# Patient Record
Sex: MALE
[De-identification: names, ages, dates, MRNs, and addresses within clinical notes are randomized per-mention and may not be internally consistent; named-entity substitution may affect disease eponyms.]

---

## 2022-07-30 ENCOUNTER — TELEPHONE ENCOUNTER (OUTPATIENT)
Age: 57
End: 2022-07-30

## 2022-07-30 RX ORDER — FAMOTIDINE 10 MG/1
2 (TWO) TABLET, FILM COATED ORAL
Qty: 0 | Refills: 16 | OUTPATIENT
Start: 2019-10-09 | End: 2019-11-12

## 2022-07-31 ENCOUNTER — TELEPHONE ENCOUNTER (OUTPATIENT)
Age: 57
End: 2022-07-31

## 2022-07-31 RX ORDER — FAMOTIDINE 10 MG/1
2 (TWO) TABLET, FILM COATED ORAL
Qty: 0 | Refills: 16 | Status: ACTIVE | COMMUNITY
Start: 2019-10-09

## 2022-07-31 RX ORDER — FAMOTIDINE 20 MG/1
1 (ONE) TABLET ORAL
Qty: 0 | Refills: 10 | Status: ACTIVE | COMMUNITY
Start: 2019-11-12

## 2024-07-16 ENCOUNTER — OFFICE VISIT (OUTPATIENT)
Dept: URBAN - METROPOLITAN AREA CLINIC 9 | Facility: CLINIC | Age: 59
End: 2024-07-16

## 2024-07-18 ENCOUNTER — OFFICE VISIT (OUTPATIENT)
Dept: URBAN - METROPOLITAN AREA CLINIC 9 | Facility: CLINIC | Age: 59
End: 2024-07-18

## 2024-07-22 ENCOUNTER — DASHBOARD ENCOUNTERS (OUTPATIENT)
Age: 59
End: 2024-07-22

## 2024-07-22 ENCOUNTER — OFFICE VISIT (OUTPATIENT)
Dept: URBAN - METROPOLITAN AREA CLINIC 9 | Facility: CLINIC | Age: 59
End: 2024-07-22
Payer: COMMERCIAL

## 2024-07-22 VITALS
HEIGHT: 68 IN | SYSTOLIC BLOOD PRESSURE: 142 MMHG | WEIGHT: 165 LBS | BODY MASS INDEX: 25.01 KG/M2 | DIASTOLIC BLOOD PRESSURE: 80 MMHG

## 2024-07-22 DIAGNOSIS — R07.89 ATYPICAL CHEST PAIN: ICD-10-CM

## 2024-07-22 PROCEDURE — 99204 OFFICE O/P NEW MOD 45 MIN: CPT | Performed by: INTERNAL MEDICINE

## 2024-07-22 RX ORDER — OMEPRAZOLE 40 MG/1
1 CAPSULE 30 MINUTES BEFORE MORNING MEAL CAPSULE, DELAYED RELEASE ORAL ONCE A DAY
Qty: 30 | Refills: 5 | OUTPATIENT

## 2024-07-22 RX ORDER — ICOSAPENT ETHYL 500 MG/1
TAKE 1 CAPSULE BY MOUTH TWICE A DAY CAPSULE ORAL
Qty: 60 EACH | Refills: 1 | Status: ACTIVE | COMMUNITY

## 2024-07-22 RX ORDER — FAMOTIDINE 10 MG/1
2 (TWO) TABLET, FILM COATED ORAL
Qty: 0 | Refills: 16 | Status: ON HOLD | COMMUNITY
Start: 2019-10-09

## 2024-07-22 RX ORDER — ASPIRIN 81 MG/1
TAKE 1 TABLET BY MOUTH EVERY DAY TABLET, COATED ORAL
Qty: 90 EACH | Refills: 0 | Status: ACTIVE | COMMUNITY

## 2024-07-22 RX ORDER — OMEPRAZOLE 40 MG/1
CAPSULE, DELAYED RELEASE ORAL
Qty: 30 CAPSULE | Status: ACTIVE | COMMUNITY

## 2024-07-22 RX ORDER — LISINOPRIL 20 MG/1
TAKE 1 TABLET BY MOUTH EVERY DAY AT NIGHT TABLET ORAL
Qty: 90 EACH | Refills: 2 | Status: ACTIVE | COMMUNITY

## 2024-07-22 RX ORDER — METOPROLOL SUCCINATE 25 MG/1
TAKE 1 TABLET BY MOUTH EVERY DAY TABLET, EXTENDED RELEASE ORAL
Qty: 90 EACH | Refills: 1 | Status: ACTIVE | COMMUNITY

## 2024-07-22 RX ORDER — FAMOTIDINE 20 MG/1
1 (ONE) TABLET ORAL
Qty: 0 | Refills: 10 | Status: ON HOLD | COMMUNITY
Start: 2019-11-12

## 2024-07-22 NOTE — HPI-TODAY'S VISIT:
Pt here today for hospital f/u . Hx/o CAD s/p cardiac event years ago . 2024 I Reviewed CBC, BMP, lfts 2024 CTA a/p neg for any acute process . 2016 Colon negative, repeat 10 years 2019 EGD with no wall's, HP neg gastritis . pt now here for f/u. he is on PPI. Pt was discharged and ultimately had cardiac evaluation that was negative. He was discharged and told it was gerd related and sent for GI evaluation.  . Pt has not had a recent EGD. He did have chest pain and we need an EGD for evaluation pending cards clearance. Willl arrange. Cont PPI that he just restarted. RTC pending the above. .
..  - Toprol, plavix and statin continued  - trop negative

## 2024-07-22 NOTE — PHYSICAL EXAM CONSTITUTIONAL:
Message relayed and patient voiced understanding.    well developed, well nourished , in no acute distress , normal communication ability

## 2024-08-14 ENCOUNTER — TELEPHONE ENCOUNTER (OUTPATIENT)
Dept: URBAN - METROPOLITAN AREA CLINIC 9 | Facility: CLINIC | Age: 59
End: 2024-08-14

## 2024-08-16 ENCOUNTER — OFFICE VISIT (OUTPATIENT)
Dept: URBAN - METROPOLITAN AREA SURGERY CENTER 9 | Facility: SURGERY CENTER | Age: 59
End: 2024-08-16
Payer: COMMERCIAL

## 2024-08-16 ENCOUNTER — CLAIMS CREATED FROM THE CLAIM WINDOW (OUTPATIENT)
Dept: URBAN - METROPOLITAN AREA CLINIC 4 | Facility: CLINIC | Age: 59
End: 2024-08-16
Payer: COMMERCIAL

## 2024-08-16 DIAGNOSIS — K29.70 GASTRITIS: ICD-10-CM

## 2024-08-16 DIAGNOSIS — K29.70 GASTRITIS WITHOUT BLEEDING, UNSPECIFIED CHRONICITY, UNSPECIFIED GASTRITIS TYPE: ICD-10-CM

## 2024-08-16 DIAGNOSIS — K29.70 GASTRITIS, UNSPECIFIED, WITHOUT BLEEDING: ICD-10-CM

## 2024-08-16 PROCEDURE — 88312 SPECIAL STAINS GROUP 1: CPT | Performed by: PATHOLOGY

## 2024-08-16 PROCEDURE — 00731 ANES UPR GI NDSC PX NOS: CPT | Performed by: NURSE ANESTHETIST, CERTIFIED REGISTERED

## 2024-08-16 PROCEDURE — 88305 TISSUE EXAM BY PATHOLOGIST: CPT | Performed by: PATHOLOGY

## 2024-08-16 PROCEDURE — 43239 EGD BIOPSY SINGLE/MULTIPLE: CPT | Performed by: INTERNAL MEDICINE

## 2024-08-16 RX ORDER — OMEPRAZOLE 40 MG/1
1 CAPSULE 30 MINUTES BEFORE MORNING MEAL CAPSULE, DELAYED RELEASE ORAL ONCE A DAY
Qty: 30 | Refills: 5 | Status: ACTIVE | COMMUNITY

## 2024-08-16 RX ORDER — ASPIRIN 81 MG/1
TAKE 1 TABLET BY MOUTH EVERY DAY TABLET, COATED ORAL
Qty: 90 EACH | Refills: 0 | Status: ACTIVE | COMMUNITY

## 2024-08-16 RX ORDER — LISINOPRIL 20 MG/1
TAKE 1 TABLET BY MOUTH EVERY DAY AT NIGHT TABLET ORAL
Qty: 90 EACH | Refills: 2 | Status: ACTIVE | COMMUNITY

## 2024-08-16 RX ORDER — METOPROLOL SUCCINATE 25 MG/1
TAKE 1 TABLET BY MOUTH EVERY DAY TABLET, EXTENDED RELEASE ORAL
Qty: 90 EACH | Refills: 1 | Status: ACTIVE | COMMUNITY

## 2024-08-16 RX ORDER — FAMOTIDINE 20 MG/1
1 (ONE) TABLET ORAL
Qty: 0 | Refills: 10 | Status: ON HOLD | COMMUNITY
Start: 2019-11-12

## 2024-08-16 RX ORDER — FAMOTIDINE 10 MG/1
2 (TWO) TABLET, FILM COATED ORAL
Qty: 0 | Refills: 16 | Status: ON HOLD | COMMUNITY
Start: 2019-10-09

## 2024-08-16 RX ORDER — ICOSAPENT ETHYL 500 MG/1
TAKE 1 CAPSULE BY MOUTH TWICE A DAY CAPSULE ORAL
Qty: 60 EACH | Refills: 1 | Status: ACTIVE | COMMUNITY

## 2024-08-21 ENCOUNTER — TELEPHONE ENCOUNTER (OUTPATIENT)
Dept: URBAN - METROPOLITAN AREA CLINIC 9 | Facility: CLINIC | Age: 59
End: 2024-08-21

## 2024-09-27 ENCOUNTER — OFFICE VISIT (OUTPATIENT)
Dept: URBAN - METROPOLITAN AREA CLINIC 9 | Facility: CLINIC | Age: 59
End: 2024-09-27

## 2025-01-22 ENCOUNTER — TELEPHONE ENCOUNTER (OUTPATIENT)
Dept: URBAN - METROPOLITAN AREA CLINIC 9 | Facility: CLINIC | Age: 60
End: 2025-01-22

## 2025-01-22 RX ORDER — OMEPRAZOLE 40 MG/1
1 CAPSULE 30 MINUTES BEFORE MORNING MEAL CAPSULE, DELAYED RELEASE ORAL ONCE A DAY
Qty: 30 | Refills: 5

## 2025-02-20 ENCOUNTER — TELEPHONE ENCOUNTER (OUTPATIENT)
Dept: URBAN - METROPOLITAN AREA CLINIC 9 | Facility: CLINIC | Age: 60
End: 2025-02-20

## 2025-02-21 ENCOUNTER — TELEPHONE ENCOUNTER (OUTPATIENT)
Dept: URBAN - METROPOLITAN AREA CLINIC 9 | Facility: CLINIC | Age: 60
End: 2025-02-21

## 2025-02-27 ENCOUNTER — LAB OUTSIDE AN ENCOUNTER (OUTPATIENT)
Dept: URBAN - METROPOLITAN AREA CLINIC 9 | Facility: CLINIC | Age: 60
End: 2025-02-27

## 2025-02-27 ENCOUNTER — TELEPHONE ENCOUNTER (OUTPATIENT)
Dept: URBAN - METROPOLITAN AREA CLINIC 9 | Facility: CLINIC | Age: 60
End: 2025-02-27

## 2025-04-01 ENCOUNTER — OFFICE VISIT (OUTPATIENT)
Dept: URBAN - METROPOLITAN AREA CLINIC 9 | Facility: CLINIC | Age: 60
End: 2025-04-01